# Patient Record
Sex: FEMALE | Race: WHITE | NOT HISPANIC OR LATINO | URBAN - METROPOLITAN AREA
[De-identification: names, ages, dates, MRNs, and addresses within clinical notes are randomized per-mention and may not be internally consistent; named-entity substitution may affect disease eponyms.]

---

## 2017-05-16 ENCOUNTER — EMERGENCY (EMERGENCY)
Facility: HOSPITAL | Age: 72
LOS: 1 days | Discharge: HOME | End: 2017-05-16
Attending: EMERGENCY MEDICINE | Admitting: EMERGENCY MEDICINE
Payer: COMMERCIAL

## 2017-05-16 VITALS
HEART RATE: 80 BPM | DIASTOLIC BLOOD PRESSURE: 85 MMHG | RESPIRATION RATE: 18 BRPM | SYSTOLIC BLOOD PRESSURE: 124 MMHG | OXYGEN SATURATION: 99 % | TEMPERATURE: 99 F

## 2017-05-16 VITALS
TEMPERATURE: 99 F | RESPIRATION RATE: 18 BRPM | HEART RATE: 73 BPM | SYSTOLIC BLOOD PRESSURE: 109 MMHG | DIASTOLIC BLOOD PRESSURE: 74 MMHG | OXYGEN SATURATION: 100 %

## 2017-05-16 DIAGNOSIS — J06.9 ACUTE UPPER RESPIRATORY INFECTION, UNSPECIFIED: ICD-10-CM

## 2017-05-16 DIAGNOSIS — Z88.8 ALLERGY STATUS TO OTHER DRUGS, MEDICAMENTS AND BIOLOGICAL SUBSTANCES STATUS: ICD-10-CM

## 2017-05-16 DIAGNOSIS — Z88.2 ALLERGY STATUS TO SULFONAMIDES: ICD-10-CM

## 2017-05-16 DIAGNOSIS — R50.9 FEVER, UNSPECIFIED: ICD-10-CM

## 2017-05-16 DIAGNOSIS — E78.00 PURE HYPERCHOLESTEROLEMIA, UNSPECIFIED: ICD-10-CM

## 2017-05-16 DIAGNOSIS — F32.9 MAJOR DEPRESSIVE DISORDER, SINGLE EPISODE, UNSPECIFIED: ICD-10-CM

## 2017-05-16 PROCEDURE — 94640 AIRWAY INHALATION TREATMENT: CPT

## 2017-05-16 PROCEDURE — 99283 EMERGENCY DEPT VISIT LOW MDM: CPT | Mod: 25

## 2017-05-16 PROCEDURE — 71046 X-RAY EXAM CHEST 2 VIEWS: CPT

## 2017-05-16 PROCEDURE — 99284 EMERGENCY DEPT VISIT MOD MDM: CPT

## 2017-05-16 PROCEDURE — 71020: CPT | Mod: 26

## 2017-05-16 RX ORDER — IPRATROPIUM/ALBUTEROL SULFATE 18-103MCG
3 AEROSOL WITH ADAPTER (GRAM) INHALATION ONCE
Qty: 0 | Refills: 0 | Status: COMPLETED | OUTPATIENT
Start: 2017-05-16 | End: 2017-05-16

## 2017-05-16 RX ADMIN — Medication 100 MILLIGRAM(S): at 20:17

## 2017-05-16 RX ADMIN — Medication 3 MILLILITER(S): at 20:16

## 2017-05-16 RX ADMIN — Medication 1 TABLET(S): at 20:55

## 2017-05-16 NOTE — ED PROVIDER NOTE - PHYSICAL EXAMINATION
Michael: A & O x 3, NAD, HEENT WNL and no facial asymmetry; lungs with transmitted upper airway sounds bilaterally, coughing on exam, heart with reg rhythm without murmur; abdomen soft NTND; extremities with no edema; skin with no rashes, neuro exam non focal with no motor or sensory deficits

## 2017-05-16 NOTE — ED PROVIDER NOTE - CARE PLAN
Principal Discharge DX:	URI (upper respiratory infection)  Instructions for follow-up, activity and diet:	take augmentin 2x per day for 10 days  use tessalon medication for cough as needed if it is helpful (no more than 3x per day)  Follow up with your primary care doctor within 48-72 hours.   You must return for new, worsening or concerning symptoms; specifically including those listed on the attached sheet.

## 2017-05-16 NOTE — ED PROVIDER NOTE - OBJECTIVE STATEMENT
72 year old, last week nasal congestion with fever of 102 for 3 days. new cough developed 72 year old, history of polymyalgia rheumatica, last week nasal congestion with fever of 102 for 3 days. new cough developed 4 days ago with persistent nasal congestion. cough worse when supine. no recent antibiotic. visiting from [place] with no other recent travel. recently finished 10 day prednisone regimen for PMR (is on this regimen about 3x per year)    PMD: out of town

## 2017-05-16 NOTE — ED ADULT NURSE NOTE - OBJECTIVE STATEMENT
71 y/o female presenting to the ED for fever/cough x1 week; Tmax 101 last week; Per patient cough is productive with yellow flem; Patient states cough has not been responding to cough suppressants; Patient denies chest pain, dizziness, n/v/d, SOB, difficulty swallowing, urinary s/s; steady gait noted upon walking in; a&ox3; safety and comfort measures provided

## 2017-05-16 NOTE — ED PROVIDER NOTE - ATTENDING CONTRIBUTION TO CARE
72 year old, history of polymyalgia rheumatica, last week nasal congestion with fever of 102 for 3 days. new cough developed 4 days ago with persistent nasal congestion. cough worse when supine. CXR, PO abxs, reassess, f/u w PMD

## 2017-05-16 NOTE — ED PROVIDER NOTE - PLAN OF CARE
take augmentin 2x per day for 10 days  use tessalon medication for cough as needed if it is helpful (no more than 3x per day)  Follow up with your primary care doctor within 48-72 hours.   You must return for new, worsening or concerning symptoms; specifically including those listed on the attached sheet.

## 2017-05-16 NOTE — ED ADULT NURSE NOTE - PMH
Depression    Hashimoto's disease    Hypercholesteremia    Polymyalgia rheumatica    Sjogren's disease

## 2017-05-16 NOTE — ED PROVIDER NOTE - NS ED ROS FT
CONST: no fevers since 3 days ago, no chills  EYES: no itching  ENT: nasal congestion  CV: no chest pain  RESP: persistent cough with occasional yellow mucus  ABD: no abdominal pain   : no dysuria  MSK: no back pain  NEURO: no headache or additional neurologic complaints  HEME: no easy bleeding  SKIN:  no rash

## 2017-07-06 RX ORDER — SIMVASTATIN 20 MG/1
1 TABLET, FILM COATED ORAL
Qty: 0 | Refills: 0 | COMMUNITY

## 2017-07-06 RX ORDER — CITALOPRAM 10 MG/1
1 TABLET, FILM COATED ORAL
Qty: 0 | Refills: 0 | COMMUNITY

## 2017-07-06 RX ORDER — LEVOTHYROXINE SODIUM 125 MCG
0 TABLET ORAL
Qty: 0 | Refills: 0 | COMMUNITY

## 2017-12-26 ENCOUNTER — EMERGENCY (EMERGENCY)
Facility: HOSPITAL | Age: 72
LOS: 1 days | Discharge: HOME | End: 2017-12-26
Attending: EMERGENCY MEDICINE
Payer: COMMERCIAL

## 2017-12-26 VITALS
RESPIRATION RATE: 17 BRPM | WEIGHT: 134.92 LBS | DIASTOLIC BLOOD PRESSURE: 89 MMHG | SYSTOLIC BLOOD PRESSURE: 104 MMHG | OXYGEN SATURATION: 97 % | HEART RATE: 75 BPM | TEMPERATURE: 99 F | HEIGHT: 65 IN

## 2017-12-26 VITALS
HEART RATE: 58 BPM | SYSTOLIC BLOOD PRESSURE: 102 MMHG | RESPIRATION RATE: 16 BRPM | DIASTOLIC BLOOD PRESSURE: 55 MMHG | TEMPERATURE: 99 F | OXYGEN SATURATION: 100 %

## 2017-12-26 LAB
ALBUMIN SERPL ELPH-MCNC: 3.8 G/DL — SIGNIFICANT CHANGE UP (ref 3.5–5)
ALP SERPL-CCNC: 88 U/L — SIGNIFICANT CHANGE UP (ref 40–120)
ALT FLD-CCNC: 23 U/L DA — SIGNIFICANT CHANGE UP (ref 10–60)
ANION GAP SERPL CALC-SCNC: 6 MMOL/L — SIGNIFICANT CHANGE UP (ref 5–17)
AST SERPL-CCNC: 18 U/L — SIGNIFICANT CHANGE UP (ref 10–40)
BASOPHILS # BLD AUTO: 0.1 K/UL — SIGNIFICANT CHANGE UP (ref 0–0.2)
BASOPHILS NFR BLD AUTO: 1.4 % — SIGNIFICANT CHANGE UP (ref 0–2)
BILIRUB SERPL-MCNC: 0.2 MG/DL — SIGNIFICANT CHANGE UP (ref 0.2–1.2)
BUN SERPL-MCNC: 20 MG/DL — HIGH (ref 7–18)
CALCIUM SERPL-MCNC: 9 MG/DL — SIGNIFICANT CHANGE UP (ref 8.4–10.5)
CHLORIDE SERPL-SCNC: 107 MMOL/L — SIGNIFICANT CHANGE UP (ref 96–108)
CO2 SERPL-SCNC: 27 MMOL/L — SIGNIFICANT CHANGE UP (ref 22–31)
CREAT SERPL-MCNC: 0.81 MG/DL — SIGNIFICANT CHANGE UP (ref 0.5–1.3)
EOSINOPHIL # BLD AUTO: 0.4 K/UL — SIGNIFICANT CHANGE UP (ref 0–0.5)
EOSINOPHIL NFR BLD AUTO: 4.4 % — SIGNIFICANT CHANGE UP (ref 0–6)
GLUCOSE SERPL-MCNC: 84 MG/DL — SIGNIFICANT CHANGE UP (ref 70–99)
HCT VFR BLD CALC: 42.3 % — SIGNIFICANT CHANGE UP (ref 34.5–45)
HGB BLD-MCNC: 13.7 G/DL — SIGNIFICANT CHANGE UP (ref 11.5–15.5)
LYMPHOCYTES # BLD AUTO: 2.6 K/UL — SIGNIFICANT CHANGE UP (ref 1–3.3)
LYMPHOCYTES # BLD AUTO: 30.9 % — SIGNIFICANT CHANGE UP (ref 13–44)
MCHC RBC-ENTMCNC: 29.9 PG — SIGNIFICANT CHANGE UP (ref 27–34)
MCHC RBC-ENTMCNC: 32.3 GM/DL — SIGNIFICANT CHANGE UP (ref 32–36)
MCV RBC AUTO: 92.5 FL — SIGNIFICANT CHANGE UP (ref 80–100)
MONOCYTES # BLD AUTO: 0.8 K/UL — SIGNIFICANT CHANGE UP (ref 0–0.9)
MONOCYTES NFR BLD AUTO: 9.2 % — SIGNIFICANT CHANGE UP (ref 2–14)
NEUTROPHILS # BLD AUTO: 4.5 K/UL — SIGNIFICANT CHANGE UP (ref 1.8–7.4)
NEUTROPHILS NFR BLD AUTO: 54.2 % — SIGNIFICANT CHANGE UP (ref 43–77)
PLATELET # BLD AUTO: 296 K/UL — SIGNIFICANT CHANGE UP (ref 150–400)
POTASSIUM SERPL-MCNC: 4.5 MMOL/L — SIGNIFICANT CHANGE UP (ref 3.5–5.3)
POTASSIUM SERPL-SCNC: 4.5 MMOL/L — SIGNIFICANT CHANGE UP (ref 3.5–5.3)
PROT SERPL-MCNC: 7.7 G/DL — SIGNIFICANT CHANGE UP (ref 6–8.3)
RBC # BLD: 4.57 M/UL — SIGNIFICANT CHANGE UP (ref 3.8–5.2)
RBC # FLD: 12.6 % — SIGNIFICANT CHANGE UP (ref 10.3–14.5)
SODIUM SERPL-SCNC: 140 MMOL/L — SIGNIFICANT CHANGE UP (ref 135–145)
TROPONIN I SERPL-MCNC: <0.015 NG/ML — SIGNIFICANT CHANGE UP (ref 0–0.04)
WBC # BLD: 8.3 K/UL — SIGNIFICANT CHANGE UP (ref 3.8–10.5)
WBC # FLD AUTO: 8.3 K/UL — SIGNIFICANT CHANGE UP (ref 3.8–10.5)

## 2017-12-26 PROCEDURE — 84484 ASSAY OF TROPONIN QUANT: CPT

## 2017-12-26 PROCEDURE — 71020: CPT | Mod: 26

## 2017-12-26 PROCEDURE — 99283 EMERGENCY DEPT VISIT LOW MDM: CPT | Mod: 25

## 2017-12-26 PROCEDURE — 71046 X-RAY EXAM CHEST 2 VIEWS: CPT

## 2017-12-26 PROCEDURE — 93005 ELECTROCARDIOGRAM TRACING: CPT

## 2017-12-26 PROCEDURE — 99283 EMERGENCY DEPT VISIT LOW MDM: CPT

## 2017-12-26 PROCEDURE — 85027 COMPLETE CBC AUTOMATED: CPT

## 2017-12-26 PROCEDURE — 80053 COMPREHEN METABOLIC PANEL: CPT

## 2017-12-26 RX ORDER — SODIUM CHLORIDE 9 MG/ML
1000 INJECTION INTRAMUSCULAR; INTRAVENOUS; SUBCUTANEOUS ONCE
Qty: 0 | Refills: 0 | Status: COMPLETED | OUTPATIENT
Start: 2017-12-26 | End: 2017-12-26

## 2017-12-26 RX ORDER — ACETAMINOPHEN 500 MG
650 TABLET ORAL ONCE
Qty: 0 | Refills: 0 | Status: COMPLETED | OUTPATIENT
Start: 2017-12-26 | End: 2017-12-26

## 2017-12-26 RX ORDER — IBUPROFEN 200 MG
600 TABLET ORAL ONCE
Qty: 0 | Refills: 0 | Status: COMPLETED | OUTPATIENT
Start: 2017-12-26 | End: 2017-12-26

## 2017-12-26 RX ORDER — KETOROLAC TROMETHAMINE 30 MG/ML
15 SYRINGE (ML) INJECTION ONCE
Qty: 0 | Refills: 0 | Status: DISCONTINUED | OUTPATIENT
Start: 2017-12-26 | End: 2017-12-26

## 2017-12-26 RX ADMIN — Medication 650 MILLIGRAM(S): at 14:33

## 2017-12-26 RX ADMIN — Medication 600 MILLIGRAM(S): at 14:32

## 2017-12-26 NOTE — ED PROVIDER NOTE - MEDICAL DECISION MAKING DETAILS
72yoF with ST/cough/congestion. URI symptoms, no evidence of PTA/RPA, tolerating PO and breathing without difficulty or voice changes. No TM findings. Chest pain is burning and appears to be highly associated with her throat, HEART 3, and ECG/trop negative with 5 days of symptoms sufficient to r/o ACS. Character not c/w PE or dissection and no associated symptoms. Given ibuprofen/Tylenol, declined IV meds/fluids. Well appearing, hemodynamically stable. Discharged with instructions on symptom control and return precautions.

## 2017-12-26 NOTE — ED PROVIDER NOTE - CARE PLAN
Principal Discharge DX:	Acute upper respiratory infection Principal Discharge DX:	Acute upper respiratory infection  Secondary Diagnosis:	Sore throat

## 2017-12-26 NOTE — ED PROVIDER NOTE - OBJECTIVE STATEMENT
73 y/o F pt w/ a PMHx of hypothyroidism, HLD, and depression (on synthroid) presents to the ED c/o sore throat, cough and ear pain x 5 days. Pt describes the pain as a burning in the top of her chest, as if her sore throat extends into her chest, rates it as 8/10. Pt affirms being able to eat; denies fever, vomiting, dizziness, SOB. Pt is from out of town was recently on a short 1 hr flight. Allergic to Plaquenil, sulfa drugs. NKDA. 73 y/o F pt w/ a PMHx of hypothyroidism, HLD, and depression (on synthroid) presents to the ED c/o sore throat, cough and ear pain x 5 days. Pt describes the pain as a burning in the top of her chest, as if her sore throat extends into her chest, rates it as 8/10. Associated rhinorrhea and post nasal drip. Pt affirms being able to eat; denies fever, vomiting, dizziness, SOB. Pt is from out of town was recently on a short 1 hr flight. Allergic to Plaquenil, sulfa drugs. NKDA.

## 2017-12-26 NOTE — ED PROVIDER NOTE - PHYSICAL EXAMINATION
Afebrile, hemodynamically stable  NAD, well appearing  Head NCAT  EOMI grossly  MMM, uvula midline, no robe/tonsillar exudates or swelling, airway patent. TM's clear b/l with sharp reflex  No JVD  RRR, nml S1/S2, no m/r/g  Lungs CTAB, no w/r/r  Abd soft, NT, ND, nml BS, no rebound or guarding  AAO, CN's 3-12 grossly intact  FRAIRE spontaneously, no leg cyanosis or edema  Skin warm, well perfused, no rashes or hives

## 2019-12-12 ENCOUNTER — EMERGENCY (EMERGENCY)
Facility: HOSPITAL | Age: 74
LOS: 1 days | Discharge: HOME | End: 2019-12-12
Attending: EMERGENCY MEDICINE | Admitting: EMERGENCY MEDICINE
Payer: MEDICARE

## 2019-12-12 VITALS
SYSTOLIC BLOOD PRESSURE: 123 MMHG | TEMPERATURE: 98 F | HEART RATE: 85 BPM | DIASTOLIC BLOOD PRESSURE: 68 MMHG | OXYGEN SATURATION: 98 % | RESPIRATION RATE: 16 BRPM

## 2019-12-12 PROCEDURE — 71101 X-RAY EXAM UNILAT RIBS/CHEST: CPT | Mod: 26,RT

## 2019-12-12 PROCEDURE — 99284 EMERGENCY DEPT VISIT MOD MDM: CPT | Mod: GC

## 2019-12-12 RX ORDER — LIDOCAINE 4 G/100G
1 CREAM TOPICAL ONCE
Refills: 0 | Status: COMPLETED | OUTPATIENT
Start: 2019-12-12 | End: 2019-12-12

## 2019-12-12 RX ORDER — IBUPROFEN 200 MG
600 TABLET ORAL ONCE
Refills: 0 | Status: COMPLETED | OUTPATIENT
Start: 2019-12-12 | End: 2019-12-12

## 2019-12-12 RX ORDER — ACETAMINOPHEN 500 MG
975 TABLET ORAL ONCE
Refills: 0 | Status: COMPLETED | OUTPATIENT
Start: 2019-12-12 | End: 2019-12-12

## 2019-12-12 RX ADMIN — LIDOCAINE 1 PATCH: 4 CREAM TOPICAL at 21:27

## 2019-12-12 RX ADMIN — Medication 600 MILLIGRAM(S): at 21:26

## 2019-12-12 RX ADMIN — Medication 975 MILLIGRAM(S): at 21:26

## 2019-12-12 NOTE — ED PROVIDER NOTE - CLINICAL SUMMARY MEDICAL DECISION MAKING FREE TEXT BOX
Deyanira Carlton MD: 75yo F with PMH of Sjogren's disease, HLD, Hashimoto's, polymyalgia rheumatica, depression who presents with R chest pain s/p mechanical fall 3 days ago. Pt hemodynamically stable. Well appearing and in no respiratory distress. R anterior R pain. No flail chest. Concern for rib fracture. Will get EKG, rib series, pain control. If neg, will d/c

## 2019-12-12 NOTE — ED PROVIDER NOTE - NSFOLLOWUPINSTRUCTIONS_ED_ALL_ED_FT
Follow up with your PMD within 48-72 hours.  Rest.  Take tylenol 650mg every 6 hours for pain.  Follow up with Pulmonology for CT findings- take copy of results with you    Worsening or continued fever, chills, weakness, nausea, vomiting, abdominal pain or new concerning symptoms return to Emergency Department.

## 2019-12-12 NOTE — ED PROVIDER NOTE - OBJECTIVE STATEMENT
Deyanira Carlton MD: 75yo F with PMH of Sjogren's disease, HLD, Hashimoto's, polymyalgia rheumatica, depression who presents with R chest pain s/p mechanical fall 3 days ago. Pt states that she tripped up the stairs and landed on R chest. States she feels "clicking." Pain worse with sitting up and movement. No SOB. No LOC, head trauma. No anticoagulants.

## 2019-12-12 NOTE — ED PROVIDER NOTE - PROGRESS NOTE DETAILS
SAMANTHA Lara- received sign out from resident pending ct to r/o rib fractures. pt pain well controlled, negative for fracture. discussed close pulm f/u for possible AV malformation, pt denies hemoptysis or sob.

## 2019-12-12 NOTE — ED PROVIDER NOTE - CARE PLAN
Principal Discharge DX:	Fall  Secondary Diagnosis:	Rib pain Principal Discharge DX:	Chest wall contusion  Secondary Diagnosis:	Rib pain

## 2019-12-12 NOTE — ED PROVIDER NOTE - ATTENDING CONTRIBUTION TO CARE
74F h/o Hashimoto’s thyroiditis, RA on prednisone, presents s/p mechanical fall with chest pain. Patient reports mechanical fall 3d ago while walking up steps, landed on R chest. Now with pain. No SOB. On exam well appearing, nad, mmm, rrr, lungs CTA b/l, + R lateral chest wall ttp, no crepitus, no ecchymosis, abd soft NT/ND, 2+ pulses, no edema, no rash, alert, speech clear. Plan for CT chest to eval fx or underlying injury.

## 2019-12-12 NOTE — ED PROVIDER NOTE - NS ED ROS FT
General: denies fever, chills  HENT: denies nasal congestion, sore throat, rhinorrhea  Eyes: denies vision changes  CV: +chest pain  Resp: denies difficulty breathing, cough  Abdominal: denies nausea, vomiting, diarrhea, abdominal pain, blood in stool, dark stool  : denies pain with urination  MSK: denies recent trauma  Neuro: denies headaches, numbness, tingling, dizziness, lightheadedness.  Skin: denies new rashes  Endocrine: denies recent weight loss

## 2019-12-12 NOTE — ED PROVIDER NOTE - PHYSICAL EXAMINATION
CONSTITUTIONAL: Nontoxic, well nourished, well developed, elderly female, resting comfortably in no acute distress  HEAD: Normocephalic; atraumatic  EYES: Normal inspection, EOMI  ENMT: External appears normal; normal oropharynx  NECK: Supple; non-tender; no cervical lymphadenopathy  CARD: RRR; no audible murmurs, rubs, or gallops  RESP: No respiratory distress, lungs ctab/l  ABD: Soft, non-distended; non-tender; no rebound or guarding  EXT: No LE pitting edema or calf tenderness; distal pulses intact with good capillary refill  SKIN: Warm, dry, intact  MSK: Anterior R lower rib TTP, no flail chest noted, no obvious deformities.   NEURO: aaox3, moving all extremities spontaneously

## 2019-12-13 PROBLEM — M35.00 SJOGREN SYNDROME, UNSPECIFIED: Chronic | Status: ACTIVE | Noted: 2017-05-16

## 2019-12-13 PROBLEM — E78.00 PURE HYPERCHOLESTEROLEMIA, UNSPECIFIED: Chronic | Status: ACTIVE | Noted: 2017-05-16

## 2019-12-13 PROBLEM — F32.9 MAJOR DEPRESSIVE DISORDER, SINGLE EPISODE, UNSPECIFIED: Chronic | Status: ACTIVE | Noted: 2017-05-16

## 2019-12-13 PROBLEM — M35.3 POLYMYALGIA RHEUMATICA: Chronic | Status: ACTIVE | Noted: 2017-05-16

## 2019-12-13 PROBLEM — E06.3 AUTOIMMUNE THYROIDITIS: Chronic | Status: ACTIVE | Noted: 2017-05-16

## 2019-12-13 PROCEDURE — 71250 CT THORAX DX C-: CPT | Mod: 26

## 2022-06-14 NOTE — ED PROVIDER NOTE - CHIEF COMPLAINT
Problem: Safety - Adult  Goal: Absence of infection signs and symptoms  Outcome: Adequate for Discharge The patient is a 72y Female complaining of